# Patient Record
Sex: FEMALE | Race: WHITE | NOT HISPANIC OR LATINO | URBAN - METROPOLITAN AREA
[De-identification: names, ages, dates, MRNs, and addresses within clinical notes are randomized per-mention and may not be internally consistent; named-entity substitution may affect disease eponyms.]

---

## 2017-12-10 ENCOUNTER — EMERGENCY (EMERGENCY)
Facility: HOSPITAL | Age: 57
LOS: 1 days | Discharge: HOME | End: 2017-12-10
Attending: EMERGENCY MEDICINE | Admitting: EMERGENCY MEDICINE
Payer: MEDICAID

## 2017-12-10 VITALS
OXYGEN SATURATION: 97 % | WEIGHT: 139.99 LBS | RESPIRATION RATE: 15 BRPM | DIASTOLIC BLOOD PRESSURE: 109 MMHG | SYSTOLIC BLOOD PRESSURE: 182 MMHG | TEMPERATURE: 98 F | HEART RATE: 95 BPM | HEIGHT: 65 IN

## 2017-12-10 VITALS
SYSTOLIC BLOOD PRESSURE: 136 MMHG | RESPIRATION RATE: 14 BRPM | OXYGEN SATURATION: 97 % | HEART RATE: 87 BPM | DIASTOLIC BLOOD PRESSURE: 89 MMHG | TEMPERATURE: 98 F

## 2017-12-10 PROCEDURE — 99284 EMERGENCY DEPT VISIT MOD MDM: CPT

## 2017-12-10 PROCEDURE — 73562 X-RAY EXAM OF KNEE 3: CPT | Mod: 26,RT

## 2017-12-10 PROCEDURE — 99284 EMERGENCY DEPT VISIT MOD MDM: CPT | Mod: 25

## 2017-12-10 PROCEDURE — 73110 X-RAY EXAM OF WRIST: CPT | Mod: 26,50

## 2017-12-10 PROCEDURE — 29125 APPL SHORT ARM SPLINT STATIC: CPT | Mod: RT

## 2017-12-10 PROCEDURE — 73110 X-RAY EXAM OF WRIST: CPT

## 2017-12-10 PROCEDURE — 73562 X-RAY EXAM OF KNEE 3: CPT

## 2017-12-10 RX ORDER — OXYCODONE HYDROCHLORIDE 5 MG/1
1 TABLET ORAL
Qty: 12 | Refills: 0 | OUTPATIENT
Start: 2017-12-10 | End: 2017-12-11

## 2017-12-10 RX ORDER — IBUPROFEN 200 MG
600 TABLET ORAL ONCE
Qty: 0 | Refills: 0 | Status: COMPLETED | OUTPATIENT
Start: 2017-12-10 | End: 2017-12-10

## 2017-12-10 RX ADMIN — Medication 600 MILLIGRAM(S): at 15:10

## 2017-12-10 RX ADMIN — Medication 0.1 MILLIGRAM(S): at 15:24

## 2017-12-10 RX ADMIN — Medication 600 MILLIGRAM(S): at 13:02

## 2017-12-10 NOTE — ED PROVIDER NOTE - OBJECTIVE STATEMENT
Pt is a 58 yo female no sign pmhx pt sp trip and fall on ice, landing on b/l outstretched hands and left knee and co pain in all 3 areas. mild swelling b/l wrists, no head trauma, numbness, weakness or other injuries, didn't take anything for pain

## 2017-12-10 NOTE — ED PROVIDER NOTE - MUSCULOSKELETAL, MLM
Spine appears normal, range of motion is not limited,  ttp b/l distal radius, no deformity sm intact, 2+ pulses, right knee ttp posteriorly, from, no ligamentous laxity, no efffusion, form, 2+_pulses

## 2017-12-10 NOTE — ED PROVIDER NOTE - MEDICAL DECISION MAKING DETAILS
pt with b/l non displaced distal radius fractures and ? partial patella fx.  volar splints, knee immobilizer, pain meds, fu ortho

## 2017-12-10 NOTE — ED ADULT NURSE NOTE - OBJECTIVE STATEMENT
patient with right knee pain and b/l wrist pain s/p fall today. patient denies head trauma, or LOC, will continue to monitor.

## 2017-12-10 NOTE — ED ADULT NURSE REASSESSMENT NOTE - NS ED NURSE REASSESS COMMENT FT1
pt splinted and left knee placed in immobilizer, positive cap refill, denies discomfort at this time. will continue to monitor.

## 2017-12-10 NOTE — ED ADULT NURSE REASSESSMENT NOTE - NS ED NURSE REASSESS COMMENT FT1
janet JOSE made aware of patient's elevated BP, patient given medication as ordered. Will continue to monitor.

## 2017-12-10 NOTE — ED PROVIDER NOTE - CARE PLAN
Principal Discharge DX:	Distal radius fracture, left  Secondary Diagnosis:	Distal radius fracture, right

## 2017-12-14 NOTE — ED PROCEDURE NOTE - CPROC ED POST PROC CARE GUIDE1
Verbal/written post procedure instructions were given to patient/caregiver./Elevate the injured extremity as instructed./Instructed patient/caregiver to follow-up with primary care physician.
Keep the cast/splint/dressing clean and dry./Instructed patient/caregiver regarding signs and symptoms of infection./Elevate the injured extremity as instructed./Verbal/written post procedure instructions were given to patient/caregiver./Instructed patient/caregiver to follow-up with primary care physician.
Keep the cast/splint/dressing clean and dry./Elevate the injured extremity as instructed./Instructed patient/caregiver regarding signs and symptoms of infection./Verbal/written post procedure instructions were given to patient/caregiver./Instructed patient/caregiver to follow-up with primary care physician.

## 2017-12-14 NOTE — ED PROCEDURE NOTE - CPROC ED TIME OUT STATEMENT1
“Patient's name, , procedure and correct site were confirmed during the [place] Timeout.”
“Patient's name, , procedure and correct site were confirmed during the [place] Timeout.”
“Patient's name, , procedure and correct site were confirmed during the [place] Timeout.”

## 2017-12-14 NOTE — ED PROCEDURE NOTE - NS ED PERI NEURO NEG
Post-application: Motor, sensory, and vascular responses intact in the injured extremity./Pre-application: Motor, sensory, and vascular responses intact in the injured extremity./The patient/caregiver verbalized understanding of how to care for the injured extremity with splint
Post-application: Motor, sensory, and vascular responses intact in the injured extremity./Pre-application: Motor, sensory, and vascular responses intact in the injured extremity./The patient/caregiver verbalized understanding of how to care for the injured extremity with splint
Pre-application: Motor, sensory, and vascular responses intact in the injured extremity./The patient/caregiver verbalized understanding of how to care for the injured extremity with splint/Post-application: Motor, sensory, and vascular responses intact in the injured extremity.

## 2017-12-14 NOTE — ED PROCEDURE NOTE - NS ED PERI VASCULAR NEG
fingers/toes warm to touch/capillary refill time < 2 seconds/no cyanosis of extremity/no paresthesia/no swelling
no paresthesia/capillary refill time < 2 seconds/fingers/toes warm to touch/no swelling/no cyanosis of extremity
fingers/toes warm to touch/no cyanosis of extremity/capillary refill time < 2 seconds/no swelling/no paresthesia

## 2017-12-14 NOTE — ED PROCEDURE NOTE - CPROC ED POST RADIOGRAPHY1
post procedure radiography not performed

## 2019-10-07 NOTE — ED PROCEDURE NOTE - NS ED ATTENDING STATEMENT MOD
Patient presents for a screening Mantoux Tuberculin Skin Test   
Attending Only

## 2021-06-09 ENCOUNTER — APPOINTMENT (OUTPATIENT)
Dept: CARDIOLOGY | Facility: CLINIC | Age: 61
End: 2021-06-09
Payer: COMMERCIAL

## 2021-06-09 ENCOUNTER — NON-APPOINTMENT (OUTPATIENT)
Age: 61
End: 2021-06-09

## 2021-06-09 VITALS
WEIGHT: 134 LBS | DIASTOLIC BLOOD PRESSURE: 93 MMHG | BODY MASS INDEX: 21.53 KG/M2 | HEIGHT: 66 IN | OXYGEN SATURATION: 98 % | SYSTOLIC BLOOD PRESSURE: 139 MMHG | HEART RATE: 92 BPM

## 2021-06-09 DIAGNOSIS — R07.9 CHEST PAIN, UNSPECIFIED: ICD-10-CM

## 2021-06-09 DIAGNOSIS — S82.001A UNSPECIFIED FRACTURE OF RIGHT PATELLA, INITIAL ENCOUNTER FOR CLOSED FRACTURE: ICD-10-CM

## 2021-06-09 DIAGNOSIS — S52.92XA UNSPECIFIED FRACTURE OF LEFT FOREARM, INITIAL ENCOUNTER FOR CLOSED FRACTURE: ICD-10-CM

## 2021-06-09 PROBLEM — Z00.00 ENCOUNTER FOR PREVENTIVE HEALTH EXAMINATION: Status: ACTIVE | Noted: 2021-06-09

## 2021-06-09 PROCEDURE — 99072 ADDL SUPL MATRL&STAF TM PHE: CPT

## 2021-06-09 PROCEDURE — 93000 ELECTROCARDIOGRAM COMPLETE: CPT

## 2021-06-09 PROCEDURE — 99205 OFFICE O/P NEW HI 60 MIN: CPT

## 2021-06-10 LAB
ALBUMIN SERPL ELPH-MCNC: 4.7 G/DL
ALP BLD-CCNC: 114 U/L
ALT SERPL-CCNC: 43 U/L
ANION GAP SERPL CALC-SCNC: 14 MMOL/L
AST SERPL-CCNC: 29 U/L
BASOPHILS # BLD AUTO: 0.03 K/UL
BASOPHILS NFR BLD AUTO: 0.4 %
BILIRUB SERPL-MCNC: 0.4 MG/DL
BUN SERPL-MCNC: 18 MG/DL
CALCIUM SERPL-MCNC: 10.1 MG/DL
CHLORIDE SERPL-SCNC: 103 MMOL/L
CHOLEST SERPL-MCNC: 223 MG/DL
CO2 SERPL-SCNC: 26 MMOL/L
COVID-19 SPIKE DOMAIN ANTIBODY INTERPRETATION: POSITIVE
CREAT SERPL-MCNC: 0.92 MG/DL
EOSINOPHIL # BLD AUTO: 0.26 K/UL
EOSINOPHIL NFR BLD AUTO: 3.5 %
ESTIMATED AVERAGE GLUCOSE: 126 MG/DL
GLUCOSE SERPL-MCNC: 103 MG/DL
HBA1C MFR BLD HPLC: 6 %
HCT VFR BLD CALC: 49.5 %
HDLC SERPL-MCNC: 58 MG/DL
HGB BLD-MCNC: 15 G/DL
IMM GRANULOCYTES NFR BLD AUTO: 0.3 %
LDLC SERPL CALC-MCNC: 142 MG/DL
LYMPHOCYTES # BLD AUTO: 2.33 K/UL
LYMPHOCYTES NFR BLD AUTO: 31.1 %
MAN DIFF?: NORMAL
MCHC RBC-ENTMCNC: 28.4 PG
MCHC RBC-ENTMCNC: 30.3 GM/DL
MCV RBC AUTO: 93.8 FL
MONOCYTES # BLD AUTO: 0.62 K/UL
MONOCYTES NFR BLD AUTO: 8.3 %
NEUTROPHILS # BLD AUTO: 4.24 K/UL
NEUTROPHILS NFR BLD AUTO: 56.4 %
NONHDLC SERPL-MCNC: 165 MG/DL
NT-PROBNP SERPL-MCNC: 69 PG/ML
PLATELET # BLD AUTO: 376 K/UL
POTASSIUM SERPL-SCNC: 5.1 MMOL/L
PROT SERPL-MCNC: 7.6 G/DL
RBC # BLD: 5.28 M/UL
RBC # FLD: 14.6 %
SARS-COV-2 AB SERPL IA-ACNC: >250 U/ML
SODIUM SERPL-SCNC: 144 MMOL/L
TRIGL SERPL-MCNC: 114 MG/DL
TSH SERPL-ACNC: 1.67 UIU/ML
WBC # FLD AUTO: 7.5 K/UL

## 2021-12-16 ENCOUNTER — TRANSCRIPTION ENCOUNTER (OUTPATIENT)
Age: 61
End: 2021-12-16

## 2022-01-08 NOTE — ED ADULT NURSE NOTE - FALL HARM RISK TYPE OF ASSESSMENT
Airway patent, Nasal mucosa clear. Mouth with very poor dental hygiene. Throat has no vesicles, no oropharyngeal exudates and uvula is midline.
Admission

## 2022-07-14 ENCOUNTER — OUTPATIENT (OUTPATIENT)
Dept: OUTPATIENT SERVICES | Facility: HOSPITAL | Age: 62
LOS: 1 days | End: 2022-07-14

## 2022-07-14 ENCOUNTER — APPOINTMENT (OUTPATIENT)
Dept: OPHTHALMOLOGY | Facility: CLINIC | Age: 62
End: 2022-07-14

## 2022-07-15 DIAGNOSIS — H25.10 AGE-RELATED NUCLEAR CATARACT, UNSPECIFIED EYE: ICD-10-CM

## 2022-08-10 ENCOUNTER — NON-APPOINTMENT (OUTPATIENT)
Age: 62
End: 2022-08-10

## 2022-08-17 ENCOUNTER — NON-APPOINTMENT (OUTPATIENT)
Age: 62
End: 2022-08-17

## 2023-03-13 NOTE — ED PROCEDURE NOTE - NS_EDPROVIDERDISPOUSERTYPE_ED_A_ED
Patient presents with:  Dizziness: Has had dizziness and nausea for 3 days room spins and feels very tired      Clinical Decision Making:  Patient experiencing fatigue and dizziness symptoms.  EKG is WNL today.  Lungs are CTA and patient has no other sick symptoms.  Patient is vitally stable.  Blood pressure is borderline hypotensive, but this is her normal.  No significant changes in orthostatic blood pressures or pulses.  Suspect possible BPPV.  Patient prescribed meclizine.  CBC ordered to rule out anemia.  BMP ordered to rule out severe electrolyte derangement and kidney dysfunction.  Patient was given work note excusing her absence today.  She will follow-up with primary care if symptoms fail to improve.      ICD-10-CM    1. Dizziness  R42 EKG 12-lead, tracing only     CBC with platelets     Basic metabolic panel     Nursing Communication 1     CBC with platelets     Basic metabolic panel     meclizine (ANTIVERT) 25 MG tablet          Patient Instructions   1. Drink plenty of water in case dehydration is contributing to your symptoms.  2. You will be notified of your lab results once they are all back.  Your EKG is looking normal.  Your vitals were stable in different positions.  3. Take meclizine up to 3 times per day as needed for dizziness relief.  4. Continue to be cautious when moving from sitting to standing position and to sit or lay down if you have sudden dizziness to avoid falls.  5. Please follow-up with primary care if symptoms fail to improve over the course the next 1 to 2 weeks.      HPI:  Shahbaz Nam is a 38 year old female with past medical history of restrictive lung disease, vitamin D deficiency, anemia, previous SVT, scoliosis who presents today complaining of dizziness, nausea, and fatigue for the past 3 days.  Patient describes the dizziness as sensation of the room spinning around her. No recent illness. Patient no longer has a period due to having an IUD. No recent travel.  See ROS below.  
"Patient is not currently taking any medications.  Per chart review she has had similar symptoms a few years ago.  She was prescribed meclizine and symptoms improved on their own.    History obtained from the patient.    Problem List:  2020-01: Anemia, chronic disease  2019-07: SVT (supraventricular tachycardia) (H)  2018-09: Seasonal allergies  2017-12: Elevated WBC count  2014-07: Normal pregnancy, repeat  2014-07: Normal delivery  Vitamin D Deficiency  Scoliosis  Amenorrhea  Restrictive lung disease      Past Medical History:   Diagnosis Date     Abnormal CXR (chest x-ray) 12/2017    right chest volume loss     Anemia, chronic disease 1/9/2020     Scoliosis      SVT (supraventricular tachycardia) (H)      Tuberculosis     had pulmonary TB in 2001, received 4 drug regimen.        Social History     Tobacco Use     Smoking status: Never     Smokeless tobacco: Never   Substance Use Topics     Alcohol use: No       Review of Systems   Constitutional: Positive for appetite change (decreased) and fatigue. Negative for fever.   Eyes:        (+)\"seeing star\"   Respiratory: Positive for shortness of breath. Negative for wheezing.    Cardiovascular: Negative for chest pain and palpitations.        (+) \"her heart is tired\". No pounding sensation, chest pain, or SOB   Gastrointestinal: Negative for vomiting.   Genitourinary: Negative for dysuria and frequency.   Musculoskeletal: Positive for neck stiffness.   Neurological: Positive for dizziness (episodes lasting 1-2 min. Worsened by moving head too quickly. No worsened symptoms with rolling over in bed). Negative for syncope.        (+) feeling like she will pass out       Vitals:    03/13/23 1414 03/13/23 1457 03/13/23 1458 03/13/23 1459   BP: 100/65 98/66 92/61 91/62   BP Location:  Left arm Left arm Left arm   Patient Position:  Supine Sitting Standing   Cuff Size:  Adult Small Adult Small Adult Small   Pulse: 82 75 82 91   Resp: 14      Temp: 98.1  F (36.7  C)    "
  TempSrc: Oral      SpO2: 97%      Weight: 36.3 kg (80 lb)          Physical Exam  Vitals and nursing note reviewed.   Constitutional:       General: She is not in acute distress.     Appearance: She is not toxic-appearing or diaphoretic.   HENT:      Head: Normocephalic and atraumatic.      Right Ear: Tympanic membrane, ear canal and external ear normal.      Left Ear: Ear canal and external ear normal.      Ears:      Comments: Abnormal left ear anatomy due to scarring.  No findings concerning for otitis media.  Eyes:      Conjunctiva/sclera: Conjunctivae normal.   Cardiovascular:      Rate and Rhythm: Normal rate and regular rhythm.      Heart sounds: No murmur heard.  Pulmonary:      Effort: Pulmonary effort is normal. No respiratory distress.      Breath sounds: No stridor. No wheezing, rhonchi or rales.   Chest:      Comments: Heart sounds are much louder on the right side than normal.  Per chart review her x-rays have shown small right lung volume leading to movement of the heart border to the right side.  Heart sounds are otherwise normal with no murmurs or arrhythmias.  Normal rate.  Musculoskeletal:      Comments: Chest wall shape is concave on the right side due to significant scoliosis.   Neurological:      Mental Status: She is alert.   Psychiatric:         Mood and Affect: Mood normal.         Behavior: Behavior normal.         Thought Content: Thought content normal.         Judgment: Judgment normal.         Results:  Results for orders placed or performed in visit on 03/13/23   EKG 12-lead, tracing only     Status: None   Result Value Ref Range    Systolic Blood Pressure  mmHg    Diastolic Blood Pressure  mmHg    Ventricular Rate 71 BPM    Atrial Rate 71 BPM    MI Interval 144 ms    QRS Duration 74 ms     ms    QTc 406 ms    P Axis 40 degrees    R AXIS 16 degrees    T Axis 51 degrees    Interpretation ECG       Sinus rhythm  Normal ECG  When compared with ECG of 05-JUL-2019 06:36,  No 
Attending Attestation (For Attendings USE Only)...
significant change was found  Confirmed by ALESSANDRO CUMMINGS MD LOC:[facility] ([id number]) on 3/13/2023 2:59:21 PM           At the end of the encounter, I discussed results, diagnosis, medications. Discussed red flags for immediate return to clinic/ER, as well as indications for follow up if no improvement. Patient understood and agreed to plan. Patient was stable for discharge.    30 minutes spent on the date of the encounter doing chart review, history and examination, documentation, and further activities as noted.    
Attending Attestation (For Attendings USE Only)...
Attending Attestation (For Attendings USE Only)...

## 2023-05-16 NOTE — ED ADULT TRIAGE NOTE - ESI TRIAGE ACUITY LEVEL, MLM
To Schedule an Appointment 24/7  Call: [phone]    If you have any questions regarding your visit, Please contact your care team.  [facility]: [phone]  [facility] HOURS TELEPHONE NUMBER   Cephas Agbeh, M.D.      Dimas Quesada-Surgery Scheduler  Char[facility]-Surgery Scheduler         Monday - [place]:    8:00 am-4:45 pm  Tuesday - [place]:   8:00 am-4:45 pm  Friday-[place]:       8:00 am-4:45 pm  Typical Surgery Day:  Wednesday [facility]   [address]   [place] [address] [phone]   Fax [phone]    Imaging Scheduling all locations  [phone]      [facility]   [address] [place] [address] [address]   [place], [place] [address] [phone]    [facility]  [address] [address] [facility] [address]  [phone]  Fax [phone]     Urgent Care locations:  [facility] Monday-Friday                               10 am - 8 pm  Saturday and Sunday                      9 am - 5 pm  Monday-Friday                              10 am- 8 pm  Saturday and Sunday                      9 am - 5 pm    [phone]   **Surgeries** Our Surgery Schedulers will contact you to schedule. If you do not receive a call within 3 business days, please call [phone].  If you need a medication refill, please contact your pharmacy. Please allow 3 business days for your refill to be completed.  As always, Thank you for trusting us with your healthcare needs!  see additional instructions from your care team below      4

## 2024-10-12 NOTE — ED PROCEDURE NOTE - NS ED FORMED SPLINTS 1
Problem: Respiratory - Adult  Goal: Achieves optimal ventilation and oxygenation  10/12/2024 0807 by Ameena Strange RCP  Outcome: Progressing     Problem: Respiratory - Adult  Goal: Will be able to breathe spontaneously, without ventilator support  Description: Will be able to breathe spontaneously, without ventilator support  10/12/2024 0807 by Ameena Strange RCP  Outcome: Progressing     
Volar Splint
Volar Splint